# Patient Record
Sex: FEMALE | Race: OTHER | HISPANIC OR LATINO
[De-identification: names, ages, dates, MRNs, and addresses within clinical notes are randomized per-mention and may not be internally consistent; named-entity substitution may affect disease eponyms.]

---

## 2021-03-05 ENCOUNTER — TRANSCRIPTION ENCOUNTER (OUTPATIENT)
Age: 41
End: 2021-03-05

## 2021-04-16 ENCOUNTER — NON-APPOINTMENT (OUTPATIENT)
Age: 41
End: 2021-04-16

## 2021-04-16 ENCOUNTER — APPOINTMENT (OUTPATIENT)
Dept: FAMILY MEDICINE | Facility: CLINIC | Age: 41
End: 2021-04-16
Payer: COMMERCIAL

## 2021-04-16 VITALS
DIASTOLIC BLOOD PRESSURE: 70 MMHG | HEART RATE: 76 BPM | RESPIRATION RATE: 20 BRPM | BODY MASS INDEX: 30.36 KG/M2 | HEIGHT: 62 IN | SYSTOLIC BLOOD PRESSURE: 110 MMHG | WEIGHT: 165 LBS

## 2021-04-16 DIAGNOSIS — Z83.3 FAMILY HISTORY OF DIABETES MELLITUS: ICD-10-CM

## 2021-04-16 DIAGNOSIS — Z82.49 FAMILY HISTORY OF ISCHEMIC HEART DISEASE AND OTHER DISEASES OF THE CIRCULATORY SYSTEM: ICD-10-CM

## 2021-04-16 DIAGNOSIS — Z83.438 FAMILY HISTORY OF OTHER DISORDER OF LIPOPROTEIN METABOLISM AND OTHER LIPIDEMIA: ICD-10-CM

## 2021-04-16 DIAGNOSIS — Z78.9 OTHER SPECIFIED HEALTH STATUS: ICD-10-CM

## 2021-04-16 DIAGNOSIS — M26.69 OTHER SPECIFIED DISORDERS OF TEMPOROMANDIBULAR JOINT: ICD-10-CM

## 2021-04-16 DIAGNOSIS — Z80.1 FAMILY HISTORY OF MALIGNANT NEOPLASM OF TRACHEA, BRONCHUS AND LUNG: ICD-10-CM

## 2021-04-16 PROCEDURE — 99205 OFFICE O/P NEW HI 60 MIN: CPT | Mod: 25

## 2021-04-16 PROCEDURE — 99072 ADDL SUPL MATRL&STAF TM PHE: CPT

## 2021-04-16 PROCEDURE — 36415 COLL VENOUS BLD VENIPUNCTURE: CPT

## 2021-04-16 NOTE — HEALTH RISK ASSESSMENT
[No] : In the past 12 months have you used drugs other than those required for medical reasons? No [0] : 2) Feeling down, depressed, or hopeless: Not at all (0) [Patient reported PAP Smear was normal] : Patient reported PAP Smear was normal [HIV test declined] : HIV test declined [With Patient/Caregiver] : With Patient/Caregiver [Designated Healthcare Proxy] : Designated healthcare proxy [Relationship: ___] : Relationship: [unfilled] [Aggressive treatment] : aggressive treatment [] : No [Audit-CScore] : 0 [PapSmearDate] : 05/20 [AdvancecareDate] : 04/21

## 2021-04-16 NOTE — HISTORY OF PRESENT ILLNESS
[FreeTextEntry1] : Requests initial visit [de-identified] : Presents for initial visit to establish this office as PCP.  Had been using a PCP in [place] but now lives in the area.  Reviewed medical history and medication and renewed as requested.  Pt does state has a long-standing issue with migraine - had taken Fioricet in the past but now takes "Excedrin" frequently - states has migraines at least once a week - requests referral to Neurology.  Also states has alternating constipation and diarrhea and wishes to see GI.  Otherwise trying to watch her diet and remain as active as possible.  Reviewed screening and immunizations - has declined flu vaccine but has received the COVID vaccine; also offered HIV screening per guidelines - pt declines.

## 2021-04-16 NOTE — PHYSICAL EXAM
[No Abdominal Bruit] : a ~M bruit was not heard ~T in the abdomen [No Edema] : there was no peripheral edema [No Palpable Aorta] : no palpable aorta [Normal Posterior Cervical Nodes] : no posterior cervical lymphadenopathy [Normal Anterior Cervical Nodes] : no anterior cervical lymphadenopathy [Normal] : no rash [Coordination Grossly Intact] : coordination grossly intact [No Focal Deficits] : no focal deficits [Normal Gait] : normal gait [Speech Grossly Normal] : speech grossly normal [Memory Grossly Normal] : memory grossly normal [Normal Affect] : the affect was normal [Alert and Oriented x3] : oriented to person, place, and time [Normal Mood] : the mood was normal [Normal Insight/Judgement] : insight and judgment were intact

## 2021-04-16 NOTE — COUNSELING
[de-identified] : Healthy eating and activities [None] : None [Good understanding] : Patient has a good understanding of lifestyle changes and steps needed to achieve self management goal

## 2021-04-16 NOTE — ASSESSMENT
[FreeTextEntry1] : Initial visit reveals patient to be hemodynamically stable with acceptable BP\par Neuro status stable at this time - request for Neuro referral placed\par Abdominal exam benign at this encounter - symptoms suggestive of IBS - request for GI evaluation placed\par Lab profiles drawn in office and sent

## 2021-04-17 LAB
ALBUMIN SERPL ELPH-MCNC: 4.4 G/DL
ALP BLD-CCNC: 93 U/L
ALT SERPL-CCNC: 10 U/L
ANION GAP SERPL CALC-SCNC: 12 MMOL/L
AST SERPL-CCNC: 12 U/L
BASOPHILS # BLD AUTO: 0.05 K/UL
BASOPHILS NFR BLD AUTO: 0.6 %
BILIRUB SERPL-MCNC: 0.4 MG/DL
BUN SERPL-MCNC: 14 MG/DL
CALCIUM SERPL-MCNC: 9.2 MG/DL
CHLORIDE SERPL-SCNC: 105 MMOL/L
CHOLEST SERPL-MCNC: 183 MG/DL
CO2 SERPL-SCNC: 24 MMOL/L
CREAT SERPL-MCNC: 0.63 MG/DL
CRP SERPL-MCNC: <3 MG/L
EOSINOPHIL # BLD AUTO: 0.43 K/UL
EOSINOPHIL NFR BLD AUTO: 5.2 %
ERYTHROCYTE [SEDIMENTATION RATE] IN BLOOD BY WESTERGREN METHOD: 14 MM/HR
ESTIMATED AVERAGE GLUCOSE: 111 MG/DL
FOLATE SERPL-MCNC: 14.5 NG/ML
GLUCOSE SERPL-MCNC: 95 MG/DL
HBA1C MFR BLD HPLC: 5.5 %
HCT VFR BLD CALC: 39.2 %
HDLC SERPL-MCNC: 53 MG/DL
HGB BLD-MCNC: 12.5 G/DL
IMM GRANULOCYTES NFR BLD AUTO: 0.4 %
LDLC SERPL CALC-MCNC: 96 MG/DL
LYMPHOCYTES # BLD AUTO: 3.62 K/UL
LYMPHOCYTES NFR BLD AUTO: 44 %
MAN DIFF?: NORMAL
MCHC RBC-ENTMCNC: 26.9 PG
MCHC RBC-ENTMCNC: 31.9 GM/DL
MCV RBC AUTO: 84.3 FL
MONOCYTES # BLD AUTO: 0.45 K/UL
MONOCYTES NFR BLD AUTO: 5.5 %
NEUTROPHILS # BLD AUTO: 3.65 K/UL
NEUTROPHILS NFR BLD AUTO: 44.3 %
NONHDLC SERPL-MCNC: 130 MG/DL
PLATELET # BLD AUTO: 277 K/UL
POTASSIUM SERPL-SCNC: 4 MMOL/L
PROT SERPL-MCNC: 7.3 G/DL
RBC # BLD: 4.65 M/UL
RBC # FLD: 13.4 %
SODIUM SERPL-SCNC: 141 MMOL/L
T4 FREE SERPL-MCNC: 1.1 NG/DL
TRIGL SERPL-MCNC: 171 MG/DL
TSH SERPL-ACNC: 1.91 UIU/ML
VIT B12 SERPL-MCNC: 385 PG/ML
WBC # FLD AUTO: 8.23 K/UL

## 2021-04-28 ENCOUNTER — APPOINTMENT (OUTPATIENT)
Dept: GASTROENTEROLOGY | Facility: CLINIC | Age: 41
End: 2021-04-28
Payer: COMMERCIAL

## 2021-04-28 PROCEDURE — 99202 OFFICE O/P NEW SF 15 MIN: CPT | Mod: 95

## 2021-04-28 NOTE — REVIEW OF SYSTEMS
[Arthralgias] : arthralgias [Joint Pain] : joint pain [Negative] : Heme/Lymph [Anxiety] : anxiety [Proptosis] : no proptosis [Hot Flashes] : no hot flashes [Easy Bleeding] : no tendency for easy bleeding [Easy Bruising] : no tendency for easy bruising

## 2021-04-28 NOTE — HISTORY OF PRESENT ILLNESS
[Home] : at home, [unfilled] , at the time of the visit. [Medical Office: ([facility])___] : at the medical office located in  [Verbal consent obtained from patient] : the patient, [unfilled] [de-identified] : The patient reports having alternating bowel habits for many years.  This is worsened and is sometimes associated with gas bloating.  She denies heartburn, odynophagia, dysphagia or satiety.  Given poor Internet connection the visit was limited.

## 2021-04-28 NOTE — ASSESSMENT
[FreeTextEntry1] : My impression is that of a female with irritable bowel syndrome with a mixed diarrhea/constipation pump pattern.  I have counseled her to eat a high-fiber diet, exercise and to trial a gas reducing supplement.\par \par Follow-up in my office within the month for further evaluation and treatment.

## 2021-04-28 NOTE — PHYSICAL EXAM
[General Appearance - Alert] : alert [General Appearance - In No Acute Distress] : in no acute distress [Sclera] : the sclera and conjunctiva were normal [Outer Ear] : the ears and nose were normal in appearance [Neck Appearance] : the appearance of the neck was normal [] : no respiratory distress [Skin Color & Pigmentation] : normal skin color and pigmentation [Oriented To Time, Place, And Person] : oriented to person, place, and time [Impaired Insight] : insight and judgment were intact [Affect] : the affect was normal

## 2021-05-19 ENCOUNTER — APPOINTMENT (OUTPATIENT)
Dept: GASTROENTEROLOGY | Facility: CLINIC | Age: 41
End: 2021-05-19
Payer: COMMERCIAL

## 2021-05-19 VITALS
SYSTOLIC BLOOD PRESSURE: 110 MMHG | DIASTOLIC BLOOD PRESSURE: 70 MMHG | HEART RATE: 70 BPM | OXYGEN SATURATION: 98 % | HEIGHT: 62 IN | TEMPERATURE: 97.3 F | BODY MASS INDEX: 30.73 KG/M2 | WEIGHT: 167 LBS

## 2021-05-19 DIAGNOSIS — K58.2 MIXED IRRITABLE BOWEL SYNDROME: ICD-10-CM

## 2021-05-19 PROCEDURE — 99214 OFFICE O/P EST MOD 30 MIN: CPT

## 2021-05-19 PROCEDURE — 99072 ADDL SUPL MATRL&STAF TM PHE: CPT

## 2021-05-19 NOTE — REVIEW OF SYSTEMS
[Arthralgias] : arthralgias [Joint Pain] : joint pain [Anxiety] : anxiety [Proptosis] : no proptosis [Hot Flashes] : no hot flashes [Easy Bleeding] : no tendency for easy bleeding [Easy Bruising] : no tendency for easy bruising [Negative] : Heme/Lymph

## 2021-05-19 NOTE — HISTORY OF PRESENT ILLNESS
[de-identified] : The patient reports having alternating bowel habits for many years.  This is worsened and is sometimes associated with gas bloating.  She denies heartburn, odynophagia, dysphagia or satiety.  If she does not move her bowels for a few days she tends to use a laxative.  She denies rectal bleeding.  She does experience gas bloating.  She denies classic heartburn, odynophagia, dysphagia or satiety.  She eats a vegetarian diet but her weight is somewhat up but stable.  She exercises on the weekends.

## 2021-05-19 NOTE — ASSESSMENT
[FreeTextEntry1] : My impression is that of a female with irritable bowel syndrome with a mixed diarrhea/constipation pattern, associated with bloating.\par \par I have spent a great deal of time discussing the role of daily high-intensity exercise with the patient. We discussed behavior modification strategies to institute this habit.   I have discussed nutrition in great detail including consuming vegetable fibers on a daily basis and limiting simple carbohydrates 5 days per week.  We have also reviewed the benefits of soluble fiber supplementation, including (but not limited to), favorable effects on lipid profile, weight control and the salutary effects on colonic microbiota. We reviewed the effects of such daily habits on metabolism and the metabolic set point resulting in a healthy weight, decreased pain sensitivity (effecting the GI tract), bowel habits and other aspects of health.\par \par Trial of gas reducing supplement.

## 2021-05-28 ENCOUNTER — APPOINTMENT (OUTPATIENT)
Dept: NEUROLOGY | Facility: CLINIC | Age: 41
End: 2021-05-28
Payer: COMMERCIAL

## 2021-05-28 VITALS
HEIGHT: 62 IN | WEIGHT: 167 LBS | SYSTOLIC BLOOD PRESSURE: 120 MMHG | BODY MASS INDEX: 30.73 KG/M2 | TEMPERATURE: 98.2 F | OXYGEN SATURATION: 98 % | DIASTOLIC BLOOD PRESSURE: 70 MMHG | HEART RATE: 78 BPM

## 2021-05-28 PROCEDURE — 99072 ADDL SUPL MATRL&STAF TM PHE: CPT

## 2021-05-28 PROCEDURE — 99204 OFFICE O/P NEW MOD 45 MIN: CPT

## 2021-06-01 NOTE — HISTORY OF PRESENT ILLNESS
[FreeTextEntry1] : Grady Trejo is a 40 year old F with a PMHx of Endometrioma, lumbar disc herniations, TMJ dysfunction and headaches. She presents for initial evaluation in the Neurology clinic at [facility] with complaints of headaches. \par \par She has a history of migraines since her early 20s. They are becoming worse, and occurring daily.\par \par The first time she had one she was admitted to the hospital for a week. Imitrex did not work well. Fioricet did help. Now she is not taking Fioricet and she tries to control the migraines with Excedrin migraine but she feels she takes too much of it. She takes 2 tabs in the morning. She can feel when her migraines are coming on. She also has TMJ. She recently started Gabapentin for TMJ worsening, and that is helping. \par \par The headache is mostly on the right side of the head and she might need a cold compress or she has headache patches that cool her down and help a bit. It hurts more when she tries to lie down, but it can take a whole day to resolve when severe. Excedrin migraine does help. She is sensitive to light and sound when she has her headaches. She does get nausea with the headaches, she has vomited in the past. She gets blurry vision with her headaches.\par \par She is on birth control started recently. \par She has not had an MRI of the brain.\par \par No numbness/tingling. No weakness, but does feel fatigued with her headaches. No trouble with bowel/bladder function. No trouble walking. No falls. She does get dizziness and lightheadedness with onset of her headaches. 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

## 2021-06-01 NOTE — PHYSICAL EXAM
[Person] : oriented to person [Place] : oriented to place [Time] : oriented to time [Concentration Intact] : normal concentrating ability [Comprehension] : comprehension intact [Cranial Nerves Optic (II)] : visual acuity intact bilaterally,  visual fields full to confrontation, pupils equal round and reactive to light [Cranial Nerves Oculomotor (III)] : extraocular motion intact [Cranial Nerves Trigeminal (V)] : facial sensation intact symmetrically [Cranial Nerves Facial (VII)] : face symmetrical [Cranial Nerves Vestibulocochlear (VIII)] : hearing was intact bilaterally [Cranial Nerves Glossopharyngeal (IX)] : tongue and palate midline [Cranial Nerves Accessory (XI - Cranial And Spinal)] : head turning and shoulder shrug symmetric [Cranial Nerves Hypoglossal (XII)] : there was no tongue deviation with protrusion [Motor Strength] : muscle strength was normal in all four extremities [Involuntary Movements] : no involuntary movements were seen [Sensation Tactile Decrease] : light touch was intact [Abnormal Walk] : normal gait [Balance] : balance was intact [2+] : Ankle jerk left 2+ [Paresis Pronator Drift Right-Sided] : no pronator drift on the right [Paresis Pronator Drift Left-Sided] : no pronator drift on the left [Romberg's Sign] : Romberg's sign was negtive [Coordination - Dysmetria Impaired Finger-to-Nose Bilateral] : not present

## 2021-06-01 NOTE — DISCUSSION/SUMMARY
[FreeTextEntry1] : Grady Trejo is a 40 year old F with a PMHx of Endometrioma, lumbar disc herniations, TMJ dysfunction and headaches. She presents for initial evaluation in the Neurology clinic at [facility] with complaints of headaches. \par \par The patient's neurological examination was unremarkable. Her history is consistent with a diagnosis of Migraines and she qualifies for migraine prophylaxis with at least 15 headache days a month. \par \par Plan:\par - MRI brain without contrast to rule out intracranial lesions as her headaches have worsened\par - Start Nortriptyline 25mg at bedtime for migraine prophylaxis\par \par RTC 2 months\par \par All questions were answered to her satisfaction. I spent 45 minutes with this patient.

## 2021-08-02 ENCOUNTER — APPOINTMENT (OUTPATIENT)
Dept: NEUROLOGY | Facility: CLINIC | Age: 41
End: 2021-08-02
Payer: COMMERCIAL

## 2021-08-02 VITALS
SYSTOLIC BLOOD PRESSURE: 118 MMHG | WEIGHT: 167 LBS | HEART RATE: 80 BPM | HEIGHT: 62 IN | DIASTOLIC BLOOD PRESSURE: 74 MMHG | BODY MASS INDEX: 30.73 KG/M2

## 2021-08-02 PROCEDURE — 99214 OFFICE O/P EST MOD 30 MIN: CPT

## 2021-08-02 NOTE — HISTORY OF PRESENT ILLNESS
[FreeTextEntry1] : Grady Trejo is a 40 year old F with a PMHx of Endometrioma, lumbar disc herniations, TMJ dysfunction and headaches. She presents for follow up in the Neurology clinic at [facility] for headaches. \par \par Interval history:\par Since the last visit, the patient reports that she is doing well. She had 3 headaches. She takes Gabapentin 300mg BID for TMJ. She states that her first headache was less in severity and responded to Excedrin, though she tries to avoid taking Excedrin because it keeps her up. Her second headache lasted 2 days. She is interested in PRN medications. Otherwise, she reports responding well to Nortriptyline and does state she may be having breakthrough episodes because of some current life stressors as well. \par \par Initial history:\par She has a history of migraines since her early 20s. They are becoming worse, and occurring daily.\par \par The first time she had one she was admitted to the hospital for a week. Imitrex did not work well. Fioricet did help. Now she is not taking Fioricet and she tries to control the migraines with Excedrin migraine but she feels she takes too much of it. She takes 2 tabs in the morning. She can feel when her migraines are coming on. She also has TMJ. She recently started Gabapentin for TMJ worsening, and that is helping. \par \par The headache is mostly on the right side of the head and she might need a cold compress or she has headache patches that cool her down and help a bit. It hurts more when she tries to lie down, but it can take a whole day to resolve when severe. Excedrin migraine does help. She is sensitive to light and sound when she has her headaches. She does get nausea with the headaches, she has vomited in the past. She gets blurry vision with her headaches.\par \par She is on birth control started recently. \par She has not had an MRI of the brain.\par \par No numbness/tingling. No weakness, but does feel fatigued with her headaches. No trouble with bowel/bladder function. No trouble walking. No falls. She does get dizziness and lightheadedness with onset of her headaches. 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

## 2021-08-02 NOTE — DISCUSSION/SUMMARY
[FreeTextEntry1] : Grady Trejo is a 40 year old F with a PMHx of Endometrioma, lumbar disc herniations, TMJ dysfunction and headaches. She presents for follow up in the Neurology clinic at [facility] for headaches. \par \par The patient's neurological examination was unremarkable. Her history is consistent with a diagnosis of chronic migraines and is responding well to Nortriptyline with occasional breakthrough migraines that are less in severity. She did not undergo MRI brain, will consider at next visit if needed.\par \par Plan:\par - Continue Nortriptyline 25mg at bedtime for migraine prophylaxis\par - Start Sumatriptan 25mg PRN for severe breakthrough migraines\par - Also advised that Gabapentin is a good headache medication and she can increase her bedtime dose to 600mg if needed for breakthrough headaches\par \par RTC 4 months\par \par All questions were answered to her satisfaction. I spent 30 minutes with this patient.

## 2021-08-05 ENCOUNTER — APPOINTMENT (OUTPATIENT)
Dept: FAMILY MEDICINE | Facility: CLINIC | Age: 41
End: 2021-08-05
Payer: COMMERCIAL

## 2021-08-05 VITALS
DIASTOLIC BLOOD PRESSURE: 70 MMHG | SYSTOLIC BLOOD PRESSURE: 110 MMHG | WEIGHT: 157 LBS | RESPIRATION RATE: 20 BRPM | HEIGHT: 62 IN | HEART RATE: 76 BPM | BODY MASS INDEX: 28.89 KG/M2

## 2021-08-05 DIAGNOSIS — N80.9 ENDOMETRIOSIS, UNSPECIFIED: ICD-10-CM

## 2021-08-05 DIAGNOSIS — Z01.818 ENCOUNTER FOR OTHER PREPROCEDURAL EXAMINATION: ICD-10-CM

## 2021-08-05 PROCEDURE — 99214 OFFICE O/P EST MOD 30 MIN: CPT

## 2021-08-05 NOTE — ASSESSMENT
[Patient Optimized for Surgery] : Patient optimized for surgery [No Further Testing Recommended] : no further testing recommended [FreeTextEntry4] : Clinically stable as of this encounter, with acceptable PSTs\par

## 2021-08-05 NOTE — RESULTS/DATA
[] : results reviewed [de-identified] : acceptable [de-identified] : acceptable [de-identified] : acceptable

## 2021-08-05 NOTE — HISTORY OF PRESENT ILLNESS
[No Pertinent Cardiac History] : no history of aortic stenosis, atrial fibrillation, coronary artery disease, recent myocardial infarction, or implantable device/pacemaker [No Pertinent Pulmonary History] : no history of asthma, COPD, sleep apnea, or smoking [No Adverse Anesthesia Reaction] : no adverse anesthesia reaction in self or family member [Chronic Anticoagulation] : no chronic anticoagulation [Chronic Kidney Disease] : no chronic kidney disease [Diabetes] : no diabetes [(Patient denies any chest pain, claudication, dyspnea on exertion, orthopnea, palpitations or syncope)] : Patient denies any chest pain, claudication, dyspnea on exertion, orthopnea, palpitations or syncope [FreeTextEntry1] : removal of endometriosis implants [FreeTextEntry2] : 8/18/21 [FreeTextEntry3] : Dr. Tatum [FreeTextEntry4] : Presents for presurgical clearance.  Denies recent illness, cough, temp elevation, urinary symptoms.

## 2021-08-23 ENCOUNTER — NON-APPOINTMENT (OUTPATIENT)
Age: 41
End: 2021-08-23

## 2022-01-14 ENCOUNTER — NON-APPOINTMENT (OUTPATIENT)
Age: 42
End: 2022-01-14

## 2022-01-14 ENCOUNTER — APPOINTMENT (OUTPATIENT)
Dept: NEUROLOGY | Facility: CLINIC | Age: 42
End: 2022-01-14
Payer: COMMERCIAL

## 2022-01-14 PROCEDURE — 99441: CPT

## 2022-05-20 ENCOUNTER — APPOINTMENT (OUTPATIENT)
Dept: NEUROLOGY | Facility: CLINIC | Age: 42
End: 2022-05-20

## 2022-06-27 ENCOUNTER — APPOINTMENT (OUTPATIENT)
Dept: FAMILY MEDICINE | Facility: CLINIC | Age: 42
End: 2022-06-27
Payer: COMMERCIAL

## 2022-06-27 ENCOUNTER — TRANSCRIPTION ENCOUNTER (OUTPATIENT)
Age: 42
End: 2022-06-27

## 2022-06-27 VITALS
SYSTOLIC BLOOD PRESSURE: 121 MMHG | DIASTOLIC BLOOD PRESSURE: 80 MMHG | OXYGEN SATURATION: 99 % | BODY MASS INDEX: 30.18 KG/M2 | RESPIRATION RATE: 16 BRPM | WEIGHT: 164 LBS | HEIGHT: 62 IN | HEART RATE: 82 BPM | TEMPERATURE: 98.2 F

## 2022-06-27 DIAGNOSIS — I83.93 ASYMPTOMATIC VARICOSE VEINS OF BILATERAL LOWER EXTREMITIES: ICD-10-CM

## 2022-06-27 DIAGNOSIS — M79.631 PAIN IN RIGHT FOREARM: ICD-10-CM

## 2022-06-27 PROCEDURE — 99214 OFFICE O/P EST MOD 30 MIN: CPT

## 2022-06-27 NOTE — REVIEW OF SYSTEMS
[Joint Pain] : no joint pain [Joint Stiffness] : no joint stiffness [Joint Swelling] : no joint swelling [Muscle Weakness] : no muscle weakness [Muscle Pain] : muscle pain [Back Pain] : no back pain [Negative] : Heme/Lymph [FreeTextEntry9] : right forearm pain [de-identified] : varicose veins

## 2022-06-27 NOTE — ASSESSMENT
[FreeTextEntry1] : appear MSK in nature\par advised periodic rest, good ergonomic keyboard, work station\par may use NSAIDs prn after meals for next 3-5 days then as needed\par vascular referral provided as per pt request.

## 2022-06-27 NOTE — PHYSICAL EXAM
[No Acute Distress] : no acute distress [Well Nourished] : well nourished [EOMI] : extraocular movements intact [Supple] : supple [Clear to Auscultation] : lungs were clear to auscultation bilaterally [Normal S1, S2] : normal S1 and S2 [No Edema] : there was no peripheral edema [No Rash] : no rash [Normal Gait] : normal gait [de-identified] : varicose vein mild right lower ext [de-identified] : negative phalens test, range of motion, elbow, wrist intact.

## 2022-06-27 NOTE — HISTORY OF PRESENT ILLNESS
[FreeTextEntry8] : Ms Grady Trejo is a 40 yo female presents today for sensation of on/off right forearm. Pt reports in the past simillar symptoms, lately more persistent and worse. 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 Pt also concerned with blood flow to hand. On the side note pt reports hx of  mild varicose vein, lately engorging more and also causing more discomfort. Pt seeks referral to vascular for evaluation.

## 2022-08-05 ENCOUNTER — APPOINTMENT (OUTPATIENT)
Dept: NEUROLOGY | Facility: CLINIC | Age: 42
End: 2022-08-05

## 2022-08-05 VITALS
WEIGHT: 164 LBS | SYSTOLIC BLOOD PRESSURE: 118 MMHG | HEIGHT: 62 IN | DIASTOLIC BLOOD PRESSURE: 80 MMHG | BODY MASS INDEX: 30.18 KG/M2 | HEART RATE: 84 BPM

## 2022-08-05 PROCEDURE — 99213 OFFICE O/P EST LOW 20 MIN: CPT

## 2022-08-05 NOTE — HISTORY OF PRESENT ILLNESS
[FreeTextEntry1] : Grady Trejo is a 41 year old F with a PMHx of Endometrioma, lumbar disc herniations, TMJ dysfunction and headaches. She presents for follow up in the Neurology clinic at [facility] for headaches. \par \par Interval history:\par Since the last visit, she reports that her headaches have been under control. She has had occasional breakthroughs, but less severe. She is on Gabapentin daily. She has had maybe 3 breakthrough headaches in the last 3 months. She has endometriosis surgery and needs to have a partial hysterectomy and oophorectomy. \par \par Initial history:\par She has a history of migraines since her early 20s. They are becoming worse, and occurring daily.\par \par The first time she had one she was admitted to the hospital for a week. Imitrex did not work well. Fioricet did help. Now she is not taking Fioricet and she tries to control the migraines with Excedrin migraine but she feels she takes too much of it. She takes 2 tabs in the morning. She can feel when her migraines are coming on. She also has TMJ. She recently started Gabapentin for TMJ worsening, and that is helping. \par \par The headache is mostly on the right side of the head and she might need a cold compress or she has headache patches that cool her down and help a bit. It hurts more when she tries to lie down, but it can take a whole day to resolve when severe. Excedrin migraine does help. She is sensitive to light and sound when she has her headaches. She does get nausea with the headaches, she has vomited in the past. She gets blurry vision with her headaches.\par \par She is on birth control started recently. \par She has not had an MRI of the brain.\par \par No numbness/tingling. No weakness, but does feel fatigued with her headaches. No trouble with bowel/bladder function. No trouble walking. No falls. She does get dizziness and lightheadedness with onset of her headaches. 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

## 2022-08-05 NOTE — DISCUSSION/SUMMARY
[FreeTextEntry1] : Grady Trejo is a 41 year old F with a PMHx of Endometrioma, lumbar disc herniations, TMJ dysfunction and headaches. She presents for follow up in the Neurology clinic at [facility] for headaches. \par \par The patient's neurological examination was unremarkable. Her history is consistent with a diagnosis of chronic migraines and is responding well to Nortriptyline with occasional breakthrough migraines that are less in severity. \par \par Plan:\par - Continue Nortriptyline 25mg at bedtime for migraine prophylaxis\par - Continue Sumatriptan 25mg PRN for severe breakthrough migraines\par - Also advised that Gabapentin can also be used for breakthrough headaches following her endometriosis surgery\par \par RTC 1 year\par \par All questions were answered to her satisfaction. I spent 20 minutes with this patient.

## 2022-08-11 ENCOUNTER — APPOINTMENT (OUTPATIENT)
Dept: FAMILY MEDICINE | Facility: CLINIC | Age: 42
End: 2022-08-11

## 2022-08-25 ENCOUNTER — APPOINTMENT (OUTPATIENT)
Dept: FAMILY MEDICINE | Facility: CLINIC | Age: 42
End: 2022-08-25

## 2022-11-07 ENCOUNTER — RX RENEWAL (OUTPATIENT)
Age: 42
End: 2022-11-07

## 2023-03-29 ENCOUNTER — NON-APPOINTMENT (OUTPATIENT)
Age: 43
End: 2023-03-29

## 2023-04-21 ENCOUNTER — RX RENEWAL (OUTPATIENT)
Age: 43
End: 2023-04-21

## 2023-04-21 RX ORDER — GABAPENTIN 300 MG/1
300 CAPSULE ORAL
Qty: 270 | Refills: 3 | Status: ACTIVE | COMMUNITY
Start: 2021-04-16 | End: 1900-01-01

## 2023-06-19 ENCOUNTER — NON-APPOINTMENT (OUTPATIENT)
Age: 43
End: 2023-06-19

## 2023-07-07 ENCOUNTER — NON-APPOINTMENT (OUTPATIENT)
Age: 43
End: 2023-07-07

## 2023-08-07 ENCOUNTER — APPOINTMENT (OUTPATIENT)
Dept: NEUROLOGY | Facility: CLINIC | Age: 43
End: 2023-08-07
Payer: COMMERCIAL

## 2023-08-07 PROCEDURE — XXXXX: CPT | Mod: 1L

## 2023-08-11 ENCOUNTER — APPOINTMENT (OUTPATIENT)
Dept: FAMILY MEDICINE | Facility: CLINIC | Age: 43
End: 2023-08-11

## 2023-11-21 ENCOUNTER — APPOINTMENT (OUTPATIENT)
Dept: NEUROLOGY | Facility: CLINIC | Age: 43
End: 2023-11-21
Payer: COMMERCIAL

## 2023-11-21 DIAGNOSIS — G43.909 MIGRAINE, UNSPECIFIED, NOT INTRACTABLE, W/OUT STATUS MIGRAINOSUS: ICD-10-CM

## 2023-11-21 PROCEDURE — 99213 OFFICE O/P EST LOW 20 MIN: CPT | Mod: 95

## 2023-11-21 RX ORDER — SUMATRIPTAN 25 MG/1
25 TABLET, FILM COATED ORAL
Qty: 10 | Refills: 3 | Status: ACTIVE | COMMUNITY
Start: 2021-08-02 | End: 1900-01-01

## 2023-11-21 RX ORDER — NORTRIPTYLINE HYDROCHLORIDE 25 MG/1
25 CAPSULE ORAL
Qty: 90 | Refills: 3 | Status: ACTIVE | COMMUNITY
Start: 2021-05-28 | End: 1900-01-01

## 2024-04-11 ENCOUNTER — APPOINTMENT (OUTPATIENT)
Dept: FAMILY MEDICINE | Facility: CLINIC | Age: 44
End: 2024-04-11
Payer: COMMERCIAL

## 2024-04-11 ENCOUNTER — NON-APPOINTMENT (OUTPATIENT)
Age: 44
End: 2024-04-11

## 2024-04-11 VITALS
TEMPERATURE: 98.2 F | WEIGHT: 166 LBS | BODY MASS INDEX: 30.55 KG/M2 | HEART RATE: 80 BPM | OXYGEN SATURATION: 99 % | SYSTOLIC BLOOD PRESSURE: 123 MMHG | RESPIRATION RATE: 16 BRPM | HEIGHT: 62 IN | DIASTOLIC BLOOD PRESSURE: 80 MMHG

## 2024-04-11 DIAGNOSIS — I45.10 UNSPECIFIED RIGHT BUNDLE-BRANCH BLOCK: ICD-10-CM

## 2024-04-11 DIAGNOSIS — Z00.00 ENCOUNTER FOR GENERAL ADULT MEDICAL EXAMINATION W/OUT ABNORMAL FINDINGS: ICD-10-CM

## 2024-04-11 DIAGNOSIS — E66.9 OBESITY, UNSPECIFIED: ICD-10-CM

## 2024-04-11 DIAGNOSIS — R53.83 OTHER FATIGUE: ICD-10-CM

## 2024-04-11 PROCEDURE — 36415 COLL VENOUS BLD VENIPUNCTURE: CPT

## 2024-04-11 PROCEDURE — 99213 OFFICE O/P EST LOW 20 MIN: CPT | Mod: 25

## 2024-04-11 PROCEDURE — 93000 ELECTROCARDIOGRAM COMPLETE: CPT

## 2024-04-11 PROCEDURE — 99396 PREV VISIT EST AGE 40-64: CPT

## 2024-04-16 ENCOUNTER — TRANSCRIPTION ENCOUNTER (OUTPATIENT)
Age: 44
End: 2024-04-16

## 2024-04-16 NOTE — HEALTH RISK ASSESSMENT
[Fair] : ~his/her~ current health as fair  [Very Good] : ~his/her~  mood as very good [No] : In the past 12 months have you used drugs other than those required for medical reasons? No [No falls in past year] : Patient reported no falls in the past year [0] : 2) Feeling down, depressed, or hopeless: Not at all (0) [PHQ-2 Negative - No further assessment needed] : PHQ-2 Negative - No further assessment needed [Patient reported mammogram was normal] : Patient reported mammogram was normal [Patient reported PAP Smear was normal] : Patient reported PAP Smear was normal [Patient reported colonoscopy was abnormal] : Patient reported colonoscopy was abnormal [With Family] : lives with family [Employed] : employed [Graduate School] : graduate school [] :  [# Of Children ___] : has [unfilled] children [Never] : Never [FreeTextEntry1] : fatigue/ weight gain/ metabolic state  [de-identified] : none  [de-identified] : attempts  [de-identified] : good  [Reports changes in hearing] : Reports no changes in hearing [Reports changes in vision] : Reports no changes in vision [Reports changes in dental health] : Reports no changes in dental health [MammogramDate] : 06/23 [PapSmearDate] : 07/23 [ColonoscopyDate] : 03/23 [ColonoscopyComments] : not clear study needs to repeat in year [FreeTextEntry2] :

## 2024-04-16 NOTE — HISTORY OF PRESENT ILLNESS
[de-identified] : 43-year-old female with past medical history of endometriosis, history of oophorectomy, obesity, and migraines presents for physical.  Main concern is that she has been unable to lose weight, and has been significantly fatigued.  Due to pelvic pains  secondary to endometriosis has been unable to exercise.  Has been attempting to adhere to strict diet but has been unable to lose weight. Is requesting medical management for obesity. Recently has been gaining weight. Has been having difficulty losing weight since oophorectomy 2 to 3 years ago.   EKG indicative of right bundle branch block-overall feels well with no acute symptoms.  No cardiac history reported.  Minimal risk factors for heart disease noted other than obesity.

## 2024-04-16 NOTE — PLAN
[FreeTextEntry1] : Will send comprehensive blood work Ultrasound mammogram and breast ultrasound ordered Up-to-date with colon cancer screening To follow-up with GYN Screening metabolic parameters due to obesity Patient would be a good candidate for medical management due to inability to lose weight with diet and exercise.  Likely has metabolic factors that make it rather difficult for her to lose weight Discussed risk versus benefits of taking GLP-1 agonist including increase chances of papillary thyroid cancer, and pancreatitis.  Aware of more common side effects including nausea, vomiting, diarrhea, constipation, and abdominal pains.  Can see JALEEL. Also aware that overall GLP-1 agonist tend to be on backorder and hard to prescribe at times.  Also aware that medication is generally expensive and sometimes insurance may not cover strictly for weight loss.  EKG indicative of right bundle branch block-for completeness sake we will send to cardiology for further workup.

## 2024-04-17 ENCOUNTER — TRANSCRIPTION ENCOUNTER (OUTPATIENT)
Age: 44
End: 2024-04-17

## 2024-04-18 ENCOUNTER — TRANSCRIPTION ENCOUNTER (OUTPATIENT)
Age: 44
End: 2024-04-18

## 2024-04-23 ENCOUNTER — TRANSCRIPTION ENCOUNTER (OUTPATIENT)
Age: 44
End: 2024-04-23

## 2024-04-25 RX ORDER — TIRZEPATIDE 2.5 MG/.5ML
2.5 INJECTION, SOLUTION SUBCUTANEOUS
Qty: 3 | Refills: 0 | Status: ACTIVE | COMMUNITY
Start: 2024-04-16

## 2024-06-14 ENCOUNTER — NON-APPOINTMENT (OUTPATIENT)
Age: 44
End: 2024-06-14

## 2024-06-21 RX ORDER — SEMAGLUTIDE 0.25 MG/.5ML
0.25 INJECTION, SOLUTION SUBCUTANEOUS
Qty: 1 | Refills: 0 | Status: ACTIVE | COMMUNITY
Start: 2024-04-11

## 2024-07-16 ENCOUNTER — TRANSCRIPTION ENCOUNTER (OUTPATIENT)
Age: 44
End: 2024-07-16

## 2024-07-23 ENCOUNTER — RX RENEWAL (OUTPATIENT)
Age: 44
End: 2024-07-23

## 2024-09-04 ENCOUNTER — NON-APPOINTMENT (OUTPATIENT)
Age: 44
End: 2024-09-04

## 2024-09-19 ENCOUNTER — TRANSCRIPTION ENCOUNTER (OUTPATIENT)
Age: 44
End: 2024-09-19

## 2024-11-08 ENCOUNTER — NON-APPOINTMENT (OUTPATIENT)
Age: 44
End: 2024-11-08

## 2024-11-14 ENCOUNTER — APPOINTMENT (OUTPATIENT)
Dept: FAMILY MEDICINE | Facility: CLINIC | Age: 44
End: 2024-11-14
Payer: COMMERCIAL

## 2024-11-14 ENCOUNTER — NON-APPOINTMENT (OUTPATIENT)
Age: 44
End: 2024-11-14

## 2024-11-14 VITALS
RESPIRATION RATE: 16 BRPM | SYSTOLIC BLOOD PRESSURE: 118 MMHG | DIASTOLIC BLOOD PRESSURE: 78 MMHG | WEIGHT: 142 LBS | OXYGEN SATURATION: 98 % | TEMPERATURE: 97.1 F | HEART RATE: 81 BPM | HEIGHT: 62 IN | BODY MASS INDEX: 26.13 KG/M2

## 2024-11-14 DIAGNOSIS — R30.0 DYSURIA: ICD-10-CM

## 2024-11-14 DIAGNOSIS — R53.83 OTHER FATIGUE: ICD-10-CM

## 2024-11-14 DIAGNOSIS — R35.0 FREQUENCY OF MICTURITION: ICD-10-CM

## 2024-11-14 PROCEDURE — 99214 OFFICE O/P EST MOD 30 MIN: CPT

## 2024-11-14 PROCEDURE — 81003 URINALYSIS AUTO W/O SCOPE: CPT | Mod: QW

## 2024-11-14 RX ORDER — PHENAZOPYRIDINE 200 MG/1
200 TABLET, FILM COATED ORAL 3 TIMES DAILY
Qty: 15 | Refills: 1 | Status: ACTIVE | COMMUNITY
Start: 2024-11-14 | End: 1900-01-01

## 2024-11-14 RX ORDER — PHENAZOPYRIDINE HYDROCHLORIDE 200 MG/1
200 TABLET ORAL 3 TIMES DAILY
Qty: 15 | Refills: 0 | Status: ACTIVE | COMMUNITY
Start: 2024-11-14 | End: 1900-01-01

## 2024-11-15 LAB
APPEARANCE: CLEAR
BILIRUBIN URINE: NEGATIVE
BLOOD URINE: NEGATIVE
COLOR: NORMAL
GLUCOSE QUALITATIVE U: NEGATIVE MG/DL
KETONES URINE: NEGATIVE MG/DL
LEUKOCYTE ESTERASE URINE: NEGATIVE
NITRITE URINE: NEGATIVE
PH URINE: 5.5
PROTEIN URINE: NEGATIVE MG/DL
SPECIFIC GRAVITY URINE: 1.02
UROBILINOGEN URINE: 0.2 MG/DL

## 2024-11-18 LAB — BACTERIA UR CULT: NORMAL

## 2025-01-09 ENCOUNTER — TRANSCRIPTION ENCOUNTER (OUTPATIENT)
Age: 45
End: 2025-01-09

## 2025-01-10 ENCOUNTER — RX RENEWAL (OUTPATIENT)
Age: 45
End: 2025-01-10

## 2025-01-15 ENCOUNTER — APPOINTMENT (OUTPATIENT)
Dept: OBGYN | Facility: CLINIC | Age: 45
End: 2025-01-15

## 2025-01-15 ENCOUNTER — APPOINTMENT (OUTPATIENT)
Dept: FAMILY MEDICINE | Facility: CLINIC | Age: 45
End: 2025-01-15

## 2025-02-11 ENCOUNTER — NON-APPOINTMENT (OUTPATIENT)
Age: 45
End: 2025-02-11

## 2025-02-14 ENCOUNTER — TRANSCRIPTION ENCOUNTER (OUTPATIENT)
Age: 45
End: 2025-02-14

## 2025-02-18 ENCOUNTER — TRANSCRIPTION ENCOUNTER (OUTPATIENT)
Age: 45
End: 2025-02-18

## 2025-02-27 ENCOUNTER — APPOINTMENT (OUTPATIENT)
Dept: FAMILY MEDICINE | Facility: CLINIC | Age: 45
End: 2025-02-27

## 2025-03-02 ENCOUNTER — NON-APPOINTMENT (OUTPATIENT)
Age: 45
End: 2025-03-02

## 2025-03-06 ENCOUNTER — TRANSCRIPTION ENCOUNTER (OUTPATIENT)
Age: 45
End: 2025-03-06

## 2025-03-10 ENCOUNTER — LABORATORY RESULT (OUTPATIENT)
Age: 45
End: 2025-03-10

## 2025-03-10 ENCOUNTER — APPOINTMENT (OUTPATIENT)
Dept: OBGYN | Facility: CLINIC | Age: 45
End: 2025-03-10
Payer: COMMERCIAL

## 2025-03-10 VITALS
HEIGHT: 62 IN | BODY MASS INDEX: 25.65 KG/M2 | DIASTOLIC BLOOD PRESSURE: 64 MMHG | WEIGHT: 139.38 LBS | HEART RATE: 82 BPM | SYSTOLIC BLOOD PRESSURE: 118 MMHG | TEMPERATURE: 98.2 F | OXYGEN SATURATION: 98 %

## 2025-03-10 DIAGNOSIS — R30.0 DYSURIA: ICD-10-CM

## 2025-03-10 DIAGNOSIS — N76.2 ACUTE VULVITIS: ICD-10-CM

## 2025-03-10 DIAGNOSIS — Z01.419 ENCOUNTER FOR GYNECOLOGICAL EXAMINATION (GENERAL) (ROUTINE) W/OUT ABNORMAL FINDINGS: ICD-10-CM

## 2025-03-10 DIAGNOSIS — N80.9 ENDOMETRIOSIS, UNSPECIFIED: ICD-10-CM

## 2025-03-10 LAB
BILIRUB UR QL STRIP: NEGATIVE
CLARITY UR: CLEAR
COLLECTION METHOD: NORMAL
GLUCOSE UR-MCNC: NEGATIVE
HCG UR QL: 0.2 EU/DL
HGB UR QL STRIP.AUTO: NORMAL
KETONES UR-MCNC: NEGATIVE
LEUKOCYTE ESTERASE UR QL STRIP: NORMAL
NITRITE UR QL STRIP: NEGATIVE
PH UR STRIP: 6.5
PROT UR STRIP-MCNC: NEGATIVE
SP GR UR STRIP: 1.01

## 2025-03-10 PROCEDURE — 99204 OFFICE O/P NEW MOD 45 MIN: CPT

## 2025-03-10 RX ORDER — FLUCONAZOLE 150 MG/1
150 TABLET ORAL
Qty: 3 | Refills: 3 | Status: ACTIVE | COMMUNITY
Start: 2025-03-10 | End: 1900-01-01

## 2025-03-10 RX ORDER — TERCONAZOLE 8 MG/G
0.8 CREAM VAGINAL
Qty: 3 | Refills: 3 | Status: ACTIVE | COMMUNITY
Start: 2025-03-10 | End: 1900-01-01

## 2025-03-11 LAB
CANDIDA VAG CYTO: NOT DETECTED
G VAGINALIS+PREV SP MTYP VAG QL MICRO: NOT DETECTED
T VAGINALIS VAG QL WET PREP: NOT DETECTED

## 2025-03-12 LAB — BACTERIA UR CULT: NORMAL

## 2025-03-13 LAB
CYTOLOGY CVX/VAG DOC THIN PREP: NORMAL
HPV HIGH+LOW RISK DNA PNL CVX: DETECTED

## 2025-07-21 ENCOUNTER — NON-APPOINTMENT (OUTPATIENT)
Age: 45
End: 2025-07-21

## 2025-08-26 ENCOUNTER — NON-APPOINTMENT (OUTPATIENT)
Age: 45
End: 2025-08-26